# Patient Record
(demographics unavailable — no encounter records)

---

## 2024-10-24 NOTE — HISTORY OF PRESENT ILLNESS
[de-identified] : ORIN CASTRO is a 32 year old female with PMH of bilateral venous sinus stenosis on MRV 2018, seen by Dr. Tran in November 2020 for headaches, pulsatile tinnitus, optic nerve edema on ophtho exam. She is under the care of Neuro-ophthalmologist Dr Genaro Zuleta, [address] [place] [address] [phone].  Today, patient presents with imaging done at [facility] last year.   She has not followed up with Dr. Tran. Denies headaches this week, had headaches last week. Occasional issues with vision. Reports left sided pulsatile tinnitus.

## 2024-10-24 NOTE — PHYSICAL EXAM
[Person] : oriented to person [Place] : oriented to place [Time] : oriented to time [Abnormal Walk] : normal gait [Short Term Intact] : short term memory intact [Remote Intact] : remote memory intact [Span Intact] : the attention span was normal [Concentration Intact] : normal concentrating ability [Fluency] : fluency intact [Comprehension] : comprehension intact [Current Events] : adequate knowledge of current events [Past History] : adequate knowledge of personal past history [Vocabulary] : adequate range of vocabulary [Cranial Nerves Optic (II)] : visual acuity intact bilaterally,  pupils equal round and reactive to light [Cranial Nerves Oculomotor (III)] : extraocular motion intact [Cranial Nerves Trigeminal (V)] : facial sensation intact symmetrically [Cranial Nerves Facial (VII)] : face symmetrical [Cranial Nerves Vestibulocochlear (VIII)] : hearing was intact bilaterally [Cranial Nerves Glossopharyngeal (IX)] : tongue and palate midline [Cranial Nerves Accessory (XI - Cranial And Spinal)] : head turning and shoulder shrug symmetric [Cranial Nerves Hypoglossal (XII)] : there was no tongue deviation with protrusion [Motor Tone] : muscle tone was normal in all four extremities [Motor Strength] : muscle strength was normal in all four extremities [No Muscle Atrophy] : normal bulk in all four extremities [Sensation Tactile Decrease] : light touch was intact [Balance] : balance was intact [Past-pointing] : there was no past-pointing [Tremor] : no tremor present [2+] : Ankle jerk left 2+

## 2024-10-24 NOTE — HISTORY OF PRESENT ILLNESS
[de-identified] : ORIN CASTRO is a 32 year old female with PMH of bilateral venous sinus stenosis on MRV 2018, seen by Dr. Tran in November 2020 for headaches, pulsatile tinnitus, optic nerve edema on ophtho exam. She is under the care of Neuro-ophthalmologist Dr Genaro Zuleta, [address] [place] [address] [phone].  Today, patient presents with imaging done at [facility] last year.   She has not followed up with Dr. Tran. Denies headaches this week, had headaches last week. Occasional issues with vision. Reports left sided pulsatile tinnitus.

## 2024-10-24 NOTE — ASSESSMENT
[FreeTextEntry1] : IMPRESSION: 32F with PMH of venous sinus stenosis. Neurologically intact. p/w HA last week, denies HA this week. Reports L sided pulsatile tinnitus.Patient saw Dr. Tran 4 years ago for possible  venous sinus stenting    PLAN: MR venogram w/wo IV contrast Follow up with Dr. Tran for IIH, consideration of venous sinus stenting or other specialist due to her insurance issues

## 2024-12-16 NOTE — CONSULT LETTER
[Dear  ___] : Dear  [unfilled], [Courtesy Letter:] : I had the pleasure of seeing your patient, [unfilled], in my office today. [Please see my note below.] : Please see my note below. [Sincerely,] : Sincerely, [FreeTextEntry3] :    Dr. Benita Tran

## 2024-12-16 NOTE — HISTORY OF PRESENT ILLNESS
[de-identified] : Ms. CASTRO is a pleasant 32 year old female who presents today with a chief complaint of idiopathic intracranial hypertension.  She was diagnosed in 2018 during evaluation of headaches and papilledema.  At that time, she had a lumbar puncture with an opening pressure of 44cm H2O.   Since we last saw her, she reports having one other LP in 2021 or 2022 with an elevated opening pressure, does not recall exact number.    Currently:  Meds - She has not taken Diamox since 2022, she was losing too much weight and decided to stop it Pulsatile Tinnitus - Bilateral (LEFT>right) ear, almost constant, never tried neck pressure, bothersome Headaches - 2-3x/week, worse with laying flat Vision - Occasional blurry vision, no diplopia or TVOs; She reports papilledema at her last visit with Dr. Zuleta which is > 1 year ago.    Of note, she states she had a catheter venogram with Dr. Solorio at [facility] in 2/2023.  She did not follow up with him and does not have access to the imaging.

## 2024-12-16 NOTE — HISTORY OF PRESENT ILLNESS
[de-identified] : Ms. CASTRO is a pleasant 32 year old female who presents today with a chief complaint of idiopathic intracranial hypertension.  She was diagnosed in 2018 during evaluation of headaches and papilledema.  At that time, she had a lumbar puncture with an opening pressure of 44cm H2O.   Since we last saw her, she reports having one other LP in 2021 or 2022 with an elevated opening pressure, does not recall exact number.    Currently:  Meds - She has not taken Diamox since 2022, she was losing too much weight and decided to stop it Pulsatile Tinnitus - Bilateral (LEFT>right) ear, almost constant, never tried neck pressure, bothersome Headaches - 2-3x/week, worse with laying flat Vision - Occasional blurry vision, no diplopia or TVOs; She reports papilledema at her last visit with Dr. Zuleta which is > 1 year ago.    Of note, she states she had a catheter venogram with Dr. Solorio at [facility] in 2/2023.  She did not follow up with him and does not have access to the imaging.

## 2024-12-16 NOTE — ASSESSMENT
[FreeTextEntry1] : Impression: IIH with Papilledema in the Past No Recent Eye Exam Positional Headaches Bilateral (L>R) Pulsatile Tinnitus  MRV 11/2024 reveals moderate stenosis of the right dominant transverse venous sinus  Ms. ORIN CASTRO suffers from IIH and she has the classic appearance of lateral venous sinus stenosis on imaging.  She is a good candidate for venous sinus stenting as an appropriate minimally invasive surgical treatment, if it becomes necessary in the future.  Prior to discussing surgical treatment, I would like her to have an updated eye exam to rule out papilledema.  If papilledema is present, she should consider a trial of medical management.  Will have a follow up call with her after her eye exam to discuss next steps.  Plan: Updated Eye Exam with Dr. Zuleta Consider Trial of Diamox Follow Up Call with NP after visit with Dr. Zuleta

## 2025-01-24 NOTE — ASSESSMENT
[FreeTextEntry1] : Impression: IIH with Papilledema in the Past Positional Headaches Bilateral (L>R) Pulsatile Tinnitus  MRV 11/2024 reveals moderate stenosis of the right dominant transverse venous sinus  Ms. ORIN CASTRO suffers from IIH and she has the classic appearance of lateral venous sinus stenosis on imaging.  She is a good candidate for venous sinus stenting as an appropriate minimally invasive surgical treatment, if it becomes necessary in the future.  She is unsure what her recent eye exam with Dr. Zuleta showed although I expect she has papilledema based on restarting Diamox and follow up plan for 6 weeks.  She is tolerating the Diamox well and her headaches are almost resolved.  I will obtain Dr. Zuleta's last note.  She will call the office next month after her repeat eye exam to discuss next steps.    Plan: Obtain Last Note from Dr. Zuleta Follow Up After Next Eye Exam

## 2025-01-24 NOTE — HISTORY OF PRESENT ILLNESS
[de-identified] : Ms. CASTRO is a pleasant 32 year old female who presents today for follow up of idiopathic intracranial hypertension.  She was diagnosed in 2018 during evaluation of headaches and papilledema.  At that time, she had a lumbar puncture with an opening pressure of 44cm H2O.   She reports having one other LP in 2021 or 2022 with an elevated opening pressure, does not recall exact number.    Currently:  Meds - Restarted Diamox 1000mg Daily, Tolerating Well Pulsatile Tinnitus - Bilateral (LEFT>right) ear, almost constant, never tried neck pressure; IMPROVED on Diamox Headaches - Almost Resolved on Diamox Vision - Occasional blurry vision, no diplopia or TVOs; She saw Dr. Zuleta 3 weeks ago and is unsure what the exam shows (although he did restart Diamox and ask her to come back in 6 weeks)  Of note, she states she had a catheter venogram with Dr. Solorio at [facility] in 2/2023.  She did not follow up with him and does not have access to the imaging.

## 2025-01-24 NOTE — REASON FOR VISIT
[Home] : at home, [unfilled] , at the time of the visit. [Medical Office: ([facility])___] : at the medical office located in  [Verbal consent obtained from patient] : the patient, [unfilled] [Follow-Up: _____] : a [unfilled] follow-up visit

## 2025-02-04 NOTE — CONSULT LETTER
[Dear  ___] : Dear  [unfilled], [Courtesy Letter:] : I had the pleasure of seeing your patient, [unfilled], in my office today. [Please see my note below.] : Please see my note below. [Sincerely,] : Sincerely, 04-Feb-2025 17:10 [FreeTextEntry3] :    Dr. Benita Tran

## 2025-02-13 NOTE — DISCUSSION/SUMMARY
[de-identified] : - discussed the etiology/ pathophysiology of the patient's complaints today in layman's terms - reviewed treatment options, conservative and operative as well as the indications for each - discussed conservative treatment options including the role for anti-inflammatory medications, aspiration, bracing and therapy - reviewed operative treatment including ganglion excision and postoperative expectations - reviewed risks, benefits and alternatives to these - with all this in mind patient requests left ganglion aspiration, tolerated today - NSAIDs prn pain - f/u in 2 weeks   My cumulative time spent on this patient's visit included: Preparation for the visit, review of the medical records, review of pertinent diagnostic studies, examination and counseling of the patient on the above diagnosis, treatment plan and prognosis, orders of diagnostic tests, medications and/or appropriate procedures and documentation in the medical records of today's visit.

## 2025-02-13 NOTE — HISTORY OF PRESENT ILLNESS
[de-identified] :  02/13/2025 ORIN FERNANDEZ is a 32 year-old female here today for: Location: left wrist Complaint: The patient presents to the office today for evaluation of a left dorsal wrist mass.  She notes the sudden appearance of a painless dorsal mass over the left wrist beginning roughly a year ago.  No injuries that she can recall.  She has occasional pain with weightbearing and strenuous exercise. 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

## 2025-02-13 NOTE — IMAGING
[de-identified] : Left wrist 1.5 cm soft dorsal mass, minimally TTP No ecchymosis, swelling or wounds Normal muscle tone/ bulk Full symmetric wrist ROM with pain at terminal motion Able to make a painless composite fist +AIN/ PIN/ Ulnar n SILT throughout fingers wwp  3 views left wrist: No acute fractures or malalignment, no cortical irregularities

## 2025-02-13 NOTE — PROCEDURE
[FreeTextEntry1] : Left ganglion cyst aspiration [FreeTextEntry2] : Left dorsal ganglion cyst [FreeTextEntry3] : The risks and benefits of aspiration were discussed. Verbal consent was obtained, The site was prepped in a sterile fashion with alcohol Using an 18-gauge needle the dorsal wrist mass was aspirated producing 0.2 cc of mucoid clear fluid under sterile conditions.  A soft compressive dressing was applied. Patient tolerated the procedure without complication and was counseled on post aspiration expectations.